# Patient Record
Sex: FEMALE | Race: WHITE
[De-identification: names, ages, dates, MRNs, and addresses within clinical notes are randomized per-mention and may not be internally consistent; named-entity substitution may affect disease eponyms.]

---

## 2019-04-07 ENCOUNTER — HOSPITAL ENCOUNTER (EMERGENCY)
Dept: HOSPITAL 25 - UCCORT | Age: 24
Discharge: ANOTHER HOSPITAL | End: 2019-04-07
Payer: COMMERCIAL

## 2019-04-07 VITALS — DIASTOLIC BLOOD PRESSURE: 79 MMHG | SYSTOLIC BLOOD PRESSURE: 138 MMHG

## 2019-04-07 DIAGNOSIS — R51: Primary | ICD-10-CM

## 2019-04-07 DIAGNOSIS — H53.149: ICD-10-CM

## 2019-04-07 DIAGNOSIS — M54.2: ICD-10-CM

## 2019-04-07 DIAGNOSIS — H53.8: ICD-10-CM

## 2019-04-07 PROCEDURE — 99203 OFFICE O/P NEW LOW 30 MIN: CPT

## 2019-04-07 PROCEDURE — G0463 HOSPITAL OUTPT CLINIC VISIT: HCPCS

## 2019-04-07 NOTE — UC
Headache HPI





- HPI Summary


HPI Summary: 


Was bouncing on trampoline and had sudden pounding headache with blurred 

vision. Worst headache of her life associated with blurred vision, stiff neck 

and photophobia.





- History Of Current Complaint


Chief Complaint: UCHeadache


Stated Complaint: HA,BLURRED VISION


Hx Obtained From: Patient


Hx Last Menstrual Period: 4/1/19


Pregnant?: No


Onset/Duration: Sudden Onset


Onset Of Symptoms: Sudden


Initially Headache Was: "Worst Headache Ever"


Currently Pain Is: Severe


Pain Intensity: 8


Timing: Constant


Character: Throbbing


Location of Headache: Diffuse


Aggravating Factor(s): Exertion, Position Change, Bright Lights


Allevating Factor(s): Rest, Position Change


Associated Signs And Symptoms: Positive: Neck Pain, Neck Stiffness, Visual 

Changes.  Negative: Decreased LOC





- Risk Factors


SAH Risk Factors: Negative





- Allergies/Home Medications


Allergies/Adverse Reactions: 


 Allergies











Allergy/AdvReac Type Severity Reaction Status Date / Time


 


No Known Allergies Allergy   Verified 04/07/19 17:25











Home Medications: 


 Home Medications





Levothyroxine TAB* [Synthroid TAB*] 25 mcg PO DAILY 04/07/19 [History Confirmed 

04/07/19]











PMH/Surg Hx/FS Hx/Imm Hx


Previously Healthy: Yes





- Surgical History


Surgical History: None





- Family History


Known Family History: 


   Negative: Diabetes





- Social History


Occupation: Unemployed


Lives: Alone - with a friend


Alcohol Use: Occasionally


Substance Use Type: None


Smoking Status (MU): Never Smoked Tobacco





Review of Systems


All Other Systems Reviewed And Are Negative: Yes


Eyes: Positive: Blurred Vision, Photophobia


Neurological: Positive: Headache





Physical Exam


Triage Information Reviewed: Yes


Appearance: Well-Appearing, Well-Nourished, Pain Distress


Vital Signs: 


 Initial Vital Signs











Temp  98.2 F   04/07/19 17:27


 


Pulse  81   04/07/19 17:27


 


Resp  18   04/07/19 17:27


 


BP  117/69   04/07/19 17:27


 


Pulse Ox  99   04/07/19 17:27











Vital Signs Reviewed: Yes


Eyes: Positive: Conjunctiva Clear, Other: - optic discs are sharp


ENT: Positive: Pharynx normal, TMs normal


Neck: Positive: Nuchal Rigidity - pain with flexion and extension.


Respiratory Exam: Normal


Cardiovascular Exam: Normal


Musculoskeletal Exam: Normal


Neurological: Positive: Alert


Psychological Exam: Normal


Skin Exam: Normal





- Additional Comments


GCS= 15





Headache Course/Dx





- Course


Course Of Treatment: 


C/O sentinal subarachnoid hemorrhage.





- Differential Dx/Diagnosis


Differential Diagnosis/HQI/PQRI: Migraine, Subarachnoid Hemorrhage, Tension 

Headache


Provider Diagnosis: 


 Acute headache








- Physician Notifications


Discussed Patient Care With: [facility] - Told to send straight to 

the ER


Time Discussed With Above Provider: 17:59


Instructed by Provider To: Transfer - To [facility] ER via ambulance.





Discharge





- Sign-Out/Discharge


Documenting (check all that apply): Patient Departure


All imaging exams completed and their final reports reviewed: No Studies





- Discharge Plan


Condition: Guarded


Disposition: TRANS HIGHER LVL OF CARE FAC


Referrals: 


No Primary Care Phys,NOPCP [Primary Care Provider] - 





- Billing Disposition and Condition


Condition: GUARDED


Disposition: Trans Higher Lvl of Care Fac

## 2021-10-09 ENCOUNTER — EMERGENCY (EMERGENCY)
Facility: HOSPITAL | Age: 26
LOS: 1 days | End: 2021-10-09
Admitting: EMERGENCY MEDICINE
Payer: MEDICAID

## 2021-10-09 PROCEDURE — 99284 EMERGENCY DEPT VISIT MOD MDM: CPT

## 2021-11-26 ENCOUNTER — EMERGENCY (EMERGENCY)
Facility: HOSPITAL | Age: 26
LOS: 1 days | End: 2021-11-26
Admitting: EMERGENCY MEDICINE
Payer: MEDICAID

## 2021-11-26 PROCEDURE — 71046 X-RAY EXAM CHEST 2 VIEWS: CPT | Mod: 26

## 2021-11-26 PROCEDURE — 99284 EMERGENCY DEPT VISIT MOD MDM: CPT

## 2022-01-13 ENCOUNTER — EMERGENCY (EMERGENCY)
Facility: HOSPITAL | Age: 27
LOS: 1 days | Discharge: HOME | End: 2022-01-13
Admitting: EMERGENCY MEDICINE
Payer: MEDICAID

## 2022-01-13 PROCEDURE — 99285 EMERGENCY DEPT VISIT HI MDM: CPT

## 2022-01-13 PROCEDURE — 99053 MED SERV 10PM-8AM 24 HR FAC: CPT

## 2022-01-14 PROCEDURE — 71260 CT THORAX DX C+: CPT | Mod: 26

## 2022-01-14 PROCEDURE — 72131 CT LUMBAR SPINE W/O DYE: CPT | Mod: 26

## 2022-01-14 PROCEDURE — 74177 CT ABD & PELVIS W/CONTRAST: CPT | Mod: 26

## 2022-01-14 PROCEDURE — 72125 CT NECK SPINE W/O DYE: CPT | Mod: 26

## 2022-01-14 PROCEDURE — 70450 CT HEAD/BRAIN W/O DYE: CPT | Mod: 26

## 2022-01-27 DIAGNOSIS — Z04.1 ENCOUNTER FOR EXAMINATION AND OBSERVATION FOLLOWING TRANSPORT ACCIDENT: ICD-10-CM

## 2022-01-27 DIAGNOSIS — M54.9 DORSALGIA, UNSPECIFIED: ICD-10-CM

## 2022-01-27 DIAGNOSIS — S09.90XA UNSPECIFIED INJURY OF HEAD, INITIAL ENCOUNTER: ICD-10-CM
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
## 2022-01-27 DIAGNOSIS — Z87.891 PERSONAL HISTORY OF NICOTINE DEPENDENCE: ICD-10-CM

## 2022-01-27 DIAGNOSIS — R07.89 OTHER CHEST PAIN: ICD-10-CM

## 2022-01-27 DIAGNOSIS — E03.9 HYPOTHYROIDISM, UNSPECIFIED: ICD-10-CM

## 2022-01-27 DIAGNOSIS — R11.0 NAUSEA: ICD-10-CM

## 2022-01-27 DIAGNOSIS — R10.84 GENERALIZED ABDOMINAL PAIN: ICD-10-CM
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
## 2022-05-12 ENCOUNTER — APPOINTMENT (OUTPATIENT)
Dept: ULTRASOUND IMAGING | Facility: CLINIC | Age: 27
End: 2022-05-12
Payer: COMMERCIAL

## 2022-05-12 PROCEDURE — 76536 US EXAM OF HEAD AND NECK: CPT

## 2023-07-19 PROBLEM — Z00.00 ENCOUNTER FOR PREVENTIVE HEALTH EXAMINATION: Status: ACTIVE | Noted: 2023-07-19
